# Patient Record
Sex: MALE | Race: WHITE | NOT HISPANIC OR LATINO | URBAN - METROPOLITAN AREA
[De-identification: names, ages, dates, MRNs, and addresses within clinical notes are randomized per-mention and may not be internally consistent; named-entity substitution may affect disease eponyms.]

---

## 2019-01-28 ENCOUNTER — IMPORTED ENCOUNTER (OUTPATIENT)
Dept: URBAN - METROPOLITAN AREA CLINIC 9 | Facility: CLINIC | Age: 74
End: 2019-01-28

## 2019-07-10 ENCOUNTER — IMPORTED ENCOUNTER (OUTPATIENT)
Dept: URBAN - METROPOLITAN AREA CLINIC 9 | Facility: CLINIC | Age: 74
End: 2019-07-10

## 2020-02-12 ENCOUNTER — IMPORTED ENCOUNTER (OUTPATIENT)
Dept: URBAN - METROPOLITAN AREA CLINIC 9 | Facility: CLINIC | Age: 75
End: 2020-02-12

## 2020-07-15 ENCOUNTER — IMPORTED ENCOUNTER (OUTPATIENT)
Dept: URBAN - METROPOLITAN AREA CLINIC 9 | Facility: CLINIC | Age: 75
End: 2020-07-15

## 2021-02-24 ENCOUNTER — IMPORTED ENCOUNTER (OUTPATIENT)
Dept: URBAN - METROPOLITAN AREA CLINIC 9 | Facility: CLINIC | Age: 76
End: 2021-02-24

## 2021-10-18 ASSESSMENT — KERATOMETRY
OS_AXISANGLE2_DEGREES: 128
OS_K2POWER_DIOPTERS: 44.5
OS_K1POWER_DIOPTERS: 43.75
OS_K1POWER_DIOPTERS: 43.75
OS_AXISANGLE_DEGREES: 38
OD_AXISANGLE_DEGREES: 3
OS_AXISANGLE2_DEGREES: 101
OS_AXISANGLE_DEGREES: 11
OD_K2POWER_DIOPTERS: 45
OD_AXISANGLE2_DEGREES: 86
OS_K2POWER_DIOPTERS: 44.25
OD_K1POWER_DIOPTERS: 43.5
OD_AXISANGLE_DEGREES: 176
OD_AXISANGLE2_DEGREES: 93
OD_K2POWER_DIOPTERS: 44.75
OD_K1POWER_DIOPTERS: 43.25

## 2021-10-18 ASSESSMENT — TONOMETRY
OD_IOP_MMHG: 13
OD_IOP_MMHG: 14
OS_IOP_MMHG: 13
OS_IOP_MMHG: 14
OD_IOP_MMHG: 13
OD_IOP_MMHG: 18
OS_IOP_MMHG: 17
OS_IOP_MMHG: 16
OD_IOP_MMHG: 11
OS_IOP_MMHG: 16

## 2021-10-18 ASSESSMENT — VISUAL ACUITY
OS_SC: CF 6' LV
OD_CC: 20/20 - SN
OS_CC: CF 10' LV
OS_CC: 20/400 SN
OD_SC: 20/50 +2 SN
OD_CC: 20/25 + SN
OD_CC: 20/20 - SN
OS_CC: 20/400 LV
OD_CC: 20/25 SN
OS_CC: 20/400 SN
OD_CC: 20/20 SN
OD_SC: 20/50 + SN
OS_SC: CF 10' LV
OS_CC: CF 8' LV
OD_SC: 20/50 SN
OD_CC: 20/20 SN
OD_CC: 20/20 SN
OS_SC: 20/400 SN
OS_CC: 20/400 SN

## 2022-03-30 ENCOUNTER — ESTABLISHED PATIENT (OUTPATIENT)
Dept: URBAN - METROPOLITAN AREA CLINIC 9 | Facility: CLINIC | Age: 77
End: 2022-03-30

## 2022-03-30 DIAGNOSIS — H04.123: ICD-10-CM

## 2022-03-30 DIAGNOSIS — D31.31: ICD-10-CM

## 2022-03-30 DIAGNOSIS — H26.493: ICD-10-CM

## 2022-03-30 DIAGNOSIS — H52.223: ICD-10-CM

## 2022-03-30 PROCEDURE — 92014 COMPRE OPH EXAM EST PT 1/>: CPT

## 2022-03-30 PROCEDURE — 92015 DETERMINE REFRACTIVE STATE: CPT

## 2022-03-30 ASSESSMENT — VISUAL ACUITY
OS_CC: 20/800
OS_SC: 20/400
OU_SC: 20/40-1
OU_CC: 20/20-1
OD_CC: J1+
OD_CC: 20/20
OU_CC: J1+
OS_CC: 20/400
OD_SC: 20/40+1

## 2022-03-30 ASSESSMENT — TONOMETRY
OD_IOP_MMHG: 12
OS_IOP_MMHG: 11

## 2022-08-24 ENCOUNTER — ESTABLISHED PATIENT (OUTPATIENT)
Dept: URBAN - METROPOLITAN AREA CLINIC 9 | Facility: CLINIC | Age: 77
End: 2022-08-24

## 2022-08-24 DIAGNOSIS — H43.813: ICD-10-CM

## 2022-08-24 DIAGNOSIS — D31.31: ICD-10-CM

## 2022-08-24 DIAGNOSIS — H33.012: ICD-10-CM

## 2022-08-24 PROCEDURE — 92134 CPTRZ OPH DX IMG PST SGM RTA: CPT

## 2022-08-24 PROCEDURE — 92014 COMPRE OPH EXAM EST PT 1/>: CPT

## 2022-08-24 ASSESSMENT — TONOMETRY
OS_IOP_MMHG: 11
OD_IOP_MMHG: 13

## 2022-08-24 ASSESSMENT — VISUAL ACUITY
OS_CC: 20/400
OD_CC: 20/25-1

## 2023-07-20 NOTE — PATIENT DISCUSSION
Currently normotensive  IV labetalol as needed   Discussed condition and exacerbating conditions/situations (e.g., dry/arid environments, overhead fans, air conditioners, side effect of medications).

## 2023-07-24 ENCOUNTER — ESTABLISHED PATIENT (OUTPATIENT)
Dept: URBAN - METROPOLITAN AREA CLINIC 9 | Facility: CLINIC | Age: 78
End: 2023-07-24

## 2023-07-24 DIAGNOSIS — H52.223: ICD-10-CM

## 2023-07-24 DIAGNOSIS — H26.493: ICD-10-CM

## 2023-07-24 PROCEDURE — 92015 DETERMINE REFRACTIVE STATE: CPT

## 2023-07-24 PROCEDURE — 92014 COMPRE OPH EXAM EST PT 1/>: CPT

## 2023-07-24 ASSESSMENT — KERATOMETRY
OD_K2POWER_DIOPTERS: 44.50
OD_AXISANGLE_DEGREES: 175
OS_K2POWER_DIOPTERS: 44.25
OD_K1POWER_DIOPTERS: 43.50
OS_AXISANGLE_DEGREES: 40
OD_AXISANGLE2_DEGREES: 85
OS_AXISANGLE2_DEGREES: 130
OS_K1POWER_DIOPTERS: 43.50

## 2023-07-24 ASSESSMENT — VISUAL ACUITY
OD_SC: 20/40+2
OS_SC: CF 6FT
OU_SC: 20/40+2
OU_CC: 20/20
OS_GLARE: CF 6FT
OS_CC: CF 6FT
OD_CC: 20/30-1
OD_GLARE: 20/20

## 2023-07-24 ASSESSMENT — TONOMETRY
OS_IOP_MMHG: 12
OD_IOP_MMHG: 12

## 2023-09-20 ENCOUNTER — ESTABLISHED PATIENT (OUTPATIENT)
Dept: URBAN - METROPOLITAN AREA CLINIC 9 | Facility: CLINIC | Age: 78
End: 2023-09-20

## 2023-09-20 DIAGNOSIS — H43.813: ICD-10-CM

## 2023-09-20 DIAGNOSIS — D31.31: ICD-10-CM

## 2023-09-20 DIAGNOSIS — H53.032: ICD-10-CM

## 2023-09-20 DIAGNOSIS — H26.493: ICD-10-CM

## 2023-09-20 DIAGNOSIS — H33.012: ICD-10-CM

## 2023-09-20 PROCEDURE — 99214 OFFICE O/P EST MOD 30 MIN: CPT

## 2023-09-20 PROCEDURE — 92134 CPTRZ OPH DX IMG PST SGM RTA: CPT

## 2023-09-20 ASSESSMENT — TONOMETRY
OS_IOP_MMHG: 11
OD_IOP_MMHG: 12

## 2023-09-20 ASSESSMENT — VISUAL ACUITY
OS_CC: CF 5FT
OD_CC: 20/25

## 2024-06-19 ENCOUNTER — COMPREHENSIVE EXAM (OUTPATIENT)
Facility: LOCATION | Age: 79
End: 2024-06-19

## 2024-06-19 DIAGNOSIS — D31.31: ICD-10-CM

## 2024-06-19 DIAGNOSIS — H53.032: ICD-10-CM

## 2024-06-19 DIAGNOSIS — H02.88A: ICD-10-CM

## 2024-06-19 DIAGNOSIS — H26.493: ICD-10-CM

## 2024-06-19 DIAGNOSIS — H04.123: ICD-10-CM

## 2024-06-19 DIAGNOSIS — H02.88B: ICD-10-CM

## 2024-06-19 PROCEDURE — 92015 DETERMINE REFRACTIVE STATE: CPT

## 2024-06-19 PROCEDURE — 92014 COMPRE OPH EXAM EST PT 1/>: CPT

## 2024-06-19 ASSESSMENT — KERATOMETRY
OD_AXISANGLE2_DEGREES: 80
OS_AXISANGLE2_DEGREES: 138
OS_K1POWER_DIOPTERS: 43.00
OD_K1POWER_DIOPTERS: 43.50
OD_AXISANGLE_DEGREES: 170
OS_AXISANGLE_DEGREES: 48
OD_K2POWER_DIOPTERS: 44.75
OS_K2POWER_DIOPTERS: 44.00

## 2024-06-19 ASSESSMENT — TONOMETRY
OS_IOP_MMHG: 13
OD_IOP_MMHG: 14

## 2024-06-19 ASSESSMENT — VISUAL ACUITY
OS_CC: CF 5FT
OD_CC: 20/20

## 2024-10-02 ENCOUNTER — COMPREHENSIVE EXAM (OUTPATIENT)
Dept: URBAN - METROPOLITAN AREA CLINIC 11 | Facility: CLINIC | Age: 79
End: 2024-10-02

## 2024-10-02 DIAGNOSIS — H53.032: ICD-10-CM

## 2024-10-02 DIAGNOSIS — D31.31: ICD-10-CM

## 2024-10-02 DIAGNOSIS — H26.493: ICD-10-CM

## 2024-10-02 DIAGNOSIS — H43.813: ICD-10-CM

## 2024-10-02 DIAGNOSIS — H33.012: ICD-10-CM

## 2024-10-02 PROCEDURE — 99214 OFFICE O/P EST MOD 30 MIN: CPT

## 2024-10-02 PROCEDURE — 92134 CPTRZ OPH DX IMG PST SGM RTA: CPT

## 2025-06-19 ENCOUNTER — COMPREHENSIVE EXAM (OUTPATIENT)
Age: 80
End: 2025-06-19

## 2025-06-19 DIAGNOSIS — H04.123: ICD-10-CM

## 2025-06-19 DIAGNOSIS — H33.012: ICD-10-CM

## 2025-06-19 DIAGNOSIS — H26.493: ICD-10-CM

## 2025-06-19 DIAGNOSIS — H53.032: ICD-10-CM

## 2025-06-19 DIAGNOSIS — H43.813: ICD-10-CM

## 2025-06-19 DIAGNOSIS — D31.31: ICD-10-CM

## 2025-06-19 PROCEDURE — 92014 COMPRE OPH EXAM EST PT 1/>: CPT
